# Patient Record
Sex: MALE | Race: WHITE | Employment: OTHER | URBAN - METROPOLITAN AREA
[De-identification: names, ages, dates, MRNs, and addresses within clinical notes are randomized per-mention and may not be internally consistent; named-entity substitution may affect disease eponyms.]

---

## 2017-01-10 ENCOUNTER — TRANSFERRED RECORDS (OUTPATIENT)
Dept: HEALTH INFORMATION MANAGEMENT | Facility: CLINIC | Age: 67
End: 2017-01-10

## 2017-01-19 ENCOUNTER — HOSPITAL ENCOUNTER (OUTPATIENT)
Dept: CARDIAC REHAB | Facility: CLINIC | Age: 67
End: 2017-01-19
Payer: COMMERCIAL

## 2017-01-19 VITALS — WEIGHT: 260 LBS | BODY MASS INDEX: 30.7 KG/M2 | HEIGHT: 77 IN

## 2017-01-19 PROCEDURE — 40000116 ZZH STATISTIC OP CR VISIT: Performed by: OCCUPATIONAL THERAPIST

## 2017-01-19 PROCEDURE — 93798 PHYS/QHP OP CAR RHAB W/ECG: CPT | Performed by: OCCUPATIONAL THERAPIST

## 2017-01-19 ASSESSMENT — 6 MINUTE WALK TEST (6MWT)
PREDICTED: 2119.46
MALE CALC: 2106.61
GENDER SELECTION: MALE
TOTAL DISTANCE WALKED (FT): 1451
FEMALE CALC: 1440.31

## 2017-01-19 NOTE — PROGRESS NOTES
01/19/17 0800   Session   Session Initial Evaluation and Exercise Prescription   Certified through this date 02/16/17   Cardiac Rehab Assessment   Cardiac Rehab Assessment Patient is not sure he needs rehab for very long.  He has multiple risk factors, severe stress, newly diagnosed diabetes, HTN and dyslipidemia,lack of exercise and being overweight..  He has not payed attention to any of them until now.  His wife is a non practicing physician and he looks to her to answer most medical questions.The patient's history and clinical status including hemodynamics and ECG were evaluated.  The patient was assessed to be stable and appropriate to begin exercise.   The patient's functional capacity and exercise prescription were determined by the completion of the 6 minute walk test.  See results below.  The patient was oriented to the program.  Risk factor profile was completed. Goals and objectives were discussed. CV response was WNL.He complained of a stitch in his right side after the 6 minute walk.  Good prognosis for reaching above goals. Skilled therapy is necessary in order to monitor CV response to exercise, to provide education on risk factors and behavior change counseling needed to achieve patient's goals.  Plan to progress to 30-40 minutes of exercise prior to discharge from cardiac rehab.  Initial THR of 20-30 beats above RHR; Effort rating of 4-6.  Initiate muscle conditioning as appropriate.  Provide risk factor education and behavior change counseling.    I have established, reviewed and made necessary changes to the individualized treatment plan and exercise prescription for this patient.    Physician Name (printed): ________________________   Date: _______  Time: ______    Physician Signature: ___________________________________________          General Information   Treatment Diagnosis Stent   Date of Treatment Diagnosis 01/10/17   Significant Past CV History None   Comorbidities DM   Other Medical  "History dyslipidemia, depression, unspecified essential HTN, essential and other specified forms of tremor. insulin dependent diabetes.,disc durgery L3-5    Lead up symptoms 2 hour episode of chest pressure, sweating, fatigue   Hospital Location [facility].   Hospital Discharge Date 01/12/17   Signs and Symptoms Post Hospital Discharge None   Outpatient Cardiac Rehab Start Date 01/19/17   Primary Physician Dr Beny Rios   Primary Physician Follow Up Completed   Cardiologist Dr. Couch   Cardiologist Follow Up Scheduled   Ejection Fraction 55-60   Risk Stratification High  (for depression)   Summary of Cath Report   Summary of Cath Report Available   Date Performed 01/11/17   Left Main minimal   LAD severely dz mid to distalLAD   LCX minimal   RCA mild   Living and Work Status    Living Arrangements and Social Status house;spouse   Support System Live with an adult   Return to Employment Yes   Occupation    Preventative Medications   CMS recommended medications Lipid Lowering;Anticoagulants;Beta Blocker;Influenza vaccination   Falls Screen   Have you fallen two or more times in the past year? Yes   Have you fallen and had an injury in the past year? No   Comment Will continue to evaluate balance   Pain   Patient Currently in Pain No   Physical Assessments   Incisions WNL   Edema None   Right Lung Sounds not assessed   Left Lung Sounds not assessed   Limitations Orthopedic   Individualized Treatment Plan   Monitored Sessions Scheduled 36   Monitored Sessions Attended 1   Nutrition Management - Weight Management   Assessment Initial Assessment   Age 66   Weight 117.935 kg (260 lb)   Height 1.956 m (6' 5\")   BMI (Calculated) 30.9   Initial Rate Your Plate Score. Dietary tool to assess eating patterns. Scores range from 24 to 72. The higher the score the healthier the eating pattern. 49   Nutrition Management - Lipids   Lipids Labs Not Available   Prescribed Lipid Medication Yes   Statin Intensity Moderate " Intensity   Nutrition Management - Diabetes   Diabetes Type II   Do you Monitor BS at Home? Yes   Diabetes Medication Prescribed Yes, Insulin   Hb A1C Date: 11/16/16   Hb A1C Result: 7.8   Diabetes Comments Patient only takes insulin when his blood sugars indicate need   Nutrition Management Summary   Dietary Recommendations Diabetic;Low Sodium;Low Cholesterol;Low Fat   Stages of Change for Diet Compliance Preparation   Interventions Planned Attend Nutrition Education Class(es)   Goal #1 Description Patient will work on eating a healthier diet and exercising more consistently to lose 1-2# per week while in cardiac rehab.   Goal #1 Target Date 03/03/17   Nutrition Summary Comments Patient declines nutrition classes   Psychosocial Management   Psychosocial Assessment Initial   Is there history of clinical depression or increased risk of depression? History of clinical depression   Current Level of Stress per Patient Report Moderate    Current Coping Skills Has Negative Coping Skills   Initial Patient Health Questionnaire -9 Score (PHQ-9) for depression. 5-9 Minimal symptoms, 10-14 Minor depression, 15-19 Major depression, moderately severe, > 20 Major depression, severe  5   Initial [facility] Survey score.  Quality of Life:   If total score > 25 review individual areas where patient rated a 4 or 5.  Consider patients current medical condition and what role that plays on the score.   Adjust treatment protocol to improve areas of concern.  Consider the following:  PHQ9 score, DASI, and re-assessment within the next 30 days to assist with developing treatments.  22   Stages of Change Preparation   Interventions Planned Patient to verbalize understanding of negative impact of stress to personal health;Patient interested in implementing one strategy to reduce current level of stress/anxiety;Patient to attend stress management class(es)  (Taking only 1 phone call from son once a day)   Psychosocial Comments Patient  admits that he is severely stressed by son's behavior.  He only listed drinking and sleep as coping mechanisms.  His wife listed several, but patient was obviously not doing them.   Other Core Components - Hypertension   History of or Diagnosis of Hypertension Yes   Currently taking Anti-Hypertensives Yes;Beta blocker;CCB   Other Core Components - Tobacco   History of Tobacco Use Never   Activity/Exercise History   Activity/Exercise Assessment Initial   Activity/Exercise Status prior to event? Was Physically Active   Number of Days Currently participating in Moderate Physical Activity? 7   Number of Days Currently performing  Aerobic Exercise (including rehab)? 0   Number of Minutes per Session Currently of Aerobic Exercise (average)? 0   Current Stage of Change (Physical Activity) Preparation   Current Stage of Change (Aerobic Exercise) Preparation   Exercise Assessment   6 Minute Walk Predicted - Gender Selection Male   6 Minute Walk Predicted (Male) 2106.61   6 Minute Walk Predicted (Female) 1440.31   Initial 6 Minute Walk Distance (Feet) 1451 ft   Resting HR 76 bpm   Exercise HR 86 bpm   Post Exercise HR 78 bpm   Resting /86 mmHg   Exercise /80 mmHg   Post Exercise /90 mmHg   Pre  mg/dL   Post  mg/dL   Effort Rating 3   Current MET Level 3.1   MET Level Goal 5-6   ECG Rhythm Normal sinus rhythm   Ectopy None   Current Symptoms Other (comments)  (stitch in right side)   Exercise Prescription   Mode Treadmill;Elliptical;Nustep;Weights   Duration/Time 45-60 min   Frequency 3 daysweek   THR (85% of age predicted max HR) 132.6   OMNI Effort Rating (0-10 Scale) 4-6/10   Progression Continuous bouts;Total exercise time of 30-45 minutes;Aerobic exercise to OMNI rating of 5-7, and heart rate at or below target;Progress peak intensity by 1/2 MET per week   Recommended Home Exercise   Type of Exercise Elliptical;Walking   Frequency (days per week) 4-5   Duration (minutes per session) 30-45 min    Effort Rating Recommended 4-6/10   30 Day Exercise Plan Patient will know safe parameters for exercising on his elliptical.   Current Home Exercise   Type of Exercise Walking  (slowly)   Frequency (days per week) 5   Duration (minutes per session) 30   Follow-up/On-going Support   Provider follow-up needed on the following No follow-up needed   Learning Assessment   Learner Patient   Primary Language English   Preferred Learning Style Reading;Pictures/Video   Patient Education   Education recommended Anatomy and Physiology of the Heart;Diabetes;Exercise Principles;Nutrition;Stress Management;Weight Loss   Education Comments Patient balked at classes  at first.  Later said they sounded good,.

## 2017-01-19 NOTE — PROGRESS NOTES
01/19/17 0800   Session   Session Initial Evaluation and Exercise Prescription   Certified through this date 02/16/17   Cardiac Rehab Assessment   Cardiac Rehab Assessment Patient is not sure he needs rehab for very long.  He has multiple risk factors, severe stress, newly diagnosed diabetes, HTN and dyslipidemia,lack of exercise and being overweight..  He has not payed attention to any of them until now.  His wife is a non practicing physician and he looks to her to answer most medical questions.   General Information   Treatment Diagnosis Stent   Date of Treatment Diagnosis 01/10/17   Significant Past CV History None   Comorbidities DM   Other Medical History dyslipidemia, depression, unspecified essential HTN, essential and other specified forms of tremor. insulin dependent diabetes.,disc durgery L3-5    Lead up symptoms 2 hour episode of chest pressure, sweating, fatigue   Hospital Location [facility].   Hospital Discharge Date 01/12/17   Signs and Symptoms Post Hospital Discharge None   Outpatient Cardiac Rehab Start Date 01/19/17   Primary Physician Dr Beny Rios   Primary Physician Follow Up Completed   Cardiologist Dr. Couch   Cardiologist Follow Up Scheduled   Ejection Fraction 55-60   Risk Stratification High  (for depression)   Summary of Cath Report   Summary of Cath Report Available   Date Performed 01/11/17   Left Main minimal   LAD severely dz mid to distalLAD   LCX minimal   RCA mild   Living and Work Status    Living Arrangements and Social Status house;spouse   Support System Live with an adult   Return to Employment Yes   Occupation    Preventative Medications   CMS recommended medications Lipid Lowering;Anticoagulants;Beta Blocker;Influenza vaccination   Falls Screen   Have you fallen two or more times in the past year? Yes   Have you fallen and had an injury in the past year? No   Comment Will continue to evaluate balance   Pain   Patient Currently in Pain No   Physical  "Assessments   Incisions WNL   Edema None   Right Lung Sounds not assessed   Left Lung Sounds not assessed   Limitations Orthopedic   Individualized Treatment Plan   Monitored Sessions Scheduled 36   Monitored Sessions Attended 1   Nutrition Management - Weight Management   Assessment Initial Assessment   Age 66   Weight 117.935 kg (260 lb)   Height 1.956 m (6' 5\")   BMI (Calculated) 30.9   Initial Rate Your Plate Score. Dietary tool to assess eating patterns. Scores range from 24 to 72. The higher the score the healthier the eating pattern. 49   Nutrition Management - Lipids   Lipids Labs Not Available   Prescribed Lipid Medication Yes   Statin Intensity Moderate Intensity   Nutrition Management - Diabetes   Diabetes Type II   Do you Monitor BS at Home? Yes   Diabetes Medication Prescribed Yes, Insulin   Hb A1C Date: 11/16/16   Hb A1C Result: 7.8   Diabetes Comments Patient only takes insulin when his blood sugars indicate need   Nutrition Management Summary   Dietary Recommendations Diabetic;Low Sodium;Low Cholesterol;Low Fat   Stages of Change for Diet Compliance Preparation   Interventions Planned Attend Nutrition Education Class(es)   Goal #1 Description Patient will work on eating a healthier diet and exercising more consistently to lose 1-2# per week while in cardiac rehab.   Goal #1 Target Date 03/03/17   Nutrition Summary Comments Patient declines nutrition classes   Psychosocial Management   Psychosocial Assessment Initial   Is there history of clinical depression or increased risk of depression? History of clinical depression   Current Level of Stress per Patient Report Moderate    Current Coping Skills Has Negative Coping Skills   Initial Patient Health Questionnaire -9 Score (PHQ-9) for depression. 5-9 Minimal symptoms, 10-14 Minor depression, 15-19 Major depression, moderately severe, > 20 Major depression, severe  5   Initial [facility] Survey score.  Quality of Life:   If total score > 25 review " individual areas where patient rated a 4 or 5.  Consider patients current medical condition and what role that plays on the score.   Adjust treatment protocol to improve areas of concern.  Consider the following:  PHQ9 score, DASI, and re-assessment within the next 30 days to assist with developing treatments.  22   Stages of Change Preparation   Interventions Planned Patient to verbalize understanding of negative impact of stress to personal health;Patient interested in implementing one strategy to reduce current level of stress/anxiety;Patient to attend stress management class(es)  (Taking only 1 phone call from son once a day)   Psychosocial Comments Patient admits that he is severely stressed by son's behavior.  He only listed drinking and sleep as coping mechanisms.  His wife listed several, but patient was obviously not doing them.   Other Core Components - Hypertension   History of or Diagnosis of Hypertension Yes   Currently taking Anti-Hypertensives Yes;Beta blocker;CCB   Other Core Components - Tobacco   History of Tobacco Use Never   Activity/Exercise History   Activity/Exercise Assessment Initial   Activity/Exercise Status prior to event? Was Physically Active   Number of Days Currently participating in Moderate Physical Activity? 7   Number of Days Currently performing  Aerobic Exercise (including rehab)? 0   Number of Minutes per Session Currently of Aerobic Exercise (average)? 0   Current Stage of Change (Physical Activity) Preparation   Current Stage of Change (Aerobic Exercise) Preparation   Exercise Assessment   6 Minute Walk Predicted - Gender Selection Male   6 Minute Walk Predicted (Male) 2106.61   6 Minute Walk Predicted (Female) 1440.31   Initial 6 Minute Walk Distance (Feet) 1451 ft   Resting HR 76 bpm   Exercise HR 86 bpm   Post Exercise HR 78 bpm   Resting /86 mmHg   Exercise /80 mmHg   Post Exercise /90 mmHg   Pre  mg/dL   Post  mg/dL   Effort Rating 3    Current MET Level 3.1   MET Level Goal 5-6   ECG Rhythm Normal sinus rhythm   Ectopy None   Current Symptoms Other (comments)  (stitch in right side)   Exercise Prescription   Mode Treadmill;Elliptical;Nustep;Weights   Duration/Time 45-60 min   Frequency 3 daysweek   THR (85% of age predicted max HR) 132.6   OMNI Effort Rating (0-10 Scale) 4-6/10   Progression Continuous bouts;Total exercise time of 30-45 minutes;Aerobic exercise to OMNI rating of 5-7, and heart rate at or below target;Progress peak intensity by 1/2 MET per week   Recommended Home Exercise   Type of Exercise Elliptical;Walking   Frequency (days per week) 4-5   Duration (minutes per session) 30-45 min   Effort Rating Recommended 4-6/10   30 Day Exercise Plan Patient will know safe parameters for exercising on his elliptical.   Current Home Exercise   Type of Exercise Walking  (slowly)   Frequency (days per week) 5   Duration (minutes per session) 30   Follow-up/On-going Support   Provider follow-up needed on the following No follow-up needed   Learning Assessment   Learner Patient   Primary Language English   Preferred Learning Style Reading;Pictures/Video   Patient Education   Education recommended Anatomy and Physiology of the Heart;Diabetes;Exercise Principles;Nutrition;Stress Management;Weight Loss   Education Comments Patient balked at classes  at first.  Later said they sounded good,.

## 2017-02-02 ASSESSMENT — 6 MINUTE WALK TEST (6MWT)
TOTAL DISTANCE WALKED (FT): 1451
GENDER SELECTION: MALE

## 2017-02-02 NOTE — ADDENDUM NOTE
Encounter addended by: Sandi Castillo OT on: 2/2/2017  3:20 PM<BR>     Documentation filed: Inpatient Document Flowsheet

## 2017-03-03 NOTE — PROGRESS NOTES
Cardiac Rehab Discharge Summary    Reason for discharge:    Patient/family request discontinuation of services.    Progress towards goals:  Goals partially met.  Barriers to achieving goals:   discharge on same date as initial evaluation.    Recommendation(s):    Continued therapy is recommended.  Rationale/Recommendations:  PT did not return to rehab after his initial visit.  Chart discontinued .

## 2018-03-29 ENCOUNTER — HOSPITAL ENCOUNTER (OUTPATIENT)
Facility: CLINIC | Age: 68
End: 2018-03-29
Attending: COLON & RECTAL SURGERY | Admitting: COLON & RECTAL SURGERY
Payer: MEDICARE